# Patient Record
Sex: FEMALE | Race: WHITE | NOT HISPANIC OR LATINO
[De-identification: names, ages, dates, MRNs, and addresses within clinical notes are randomized per-mention and may not be internally consistent; named-entity substitution may affect disease eponyms.]

---

## 2018-06-21 ENCOUNTER — TRANSCRIPTION ENCOUNTER (OUTPATIENT)
Age: 83
End: 2018-06-21

## 2018-06-22 ENCOUNTER — OUTPATIENT (OUTPATIENT)
Dept: OUTPATIENT SERVICES | Facility: HOSPITAL | Age: 83
LOS: 1 days | End: 2018-06-22
Payer: MEDICARE

## 2018-06-22 VITALS
HEIGHT: 62 IN | RESPIRATION RATE: 17 BRPM | DIASTOLIC BLOOD PRESSURE: 62 MMHG | SYSTOLIC BLOOD PRESSURE: 178 MMHG | HEART RATE: 53 BPM | OXYGEN SATURATION: 95 % | WEIGHT: 147.71 LBS | TEMPERATURE: 98 F

## 2018-06-22 VITALS
RESPIRATION RATE: 16 BRPM | SYSTOLIC BLOOD PRESSURE: 167 MMHG | DIASTOLIC BLOOD PRESSURE: 70 MMHG | OXYGEN SATURATION: 97 % | HEART RATE: 62 BPM

## 2018-06-22 DIAGNOSIS — H25.21 AGE-RELATED CATARACT, MORGAGNIAN TYPE, RIGHT EYE: ICD-10-CM

## 2018-06-22 DIAGNOSIS — Z98.42 CATARACT EXTRACTION STATUS, LEFT EYE: Chronic | ICD-10-CM

## 2018-06-22 PROCEDURE — 66984 XCAPSL CTRC RMVL W/O ECP: CPT | Mod: RT

## 2018-06-22 PROCEDURE — V2632: CPT

## 2020-08-24 NOTE — ASU PATIENT PROFILE, ADULT - IS PATIENT PREGNANT?
Jasvir Hamilton [phone]  Spoke to pt, advised of clinical message as listed below, pt is in agreement with plan to F/U in 1 year. No further questions or concerns. Good verbal understanding.   
Please call pt- her CT chest was normal with only old healed granulomas/ calcifications. No acute findings. No further follow up needed at this time, consider repeat CT in 1 year for stability.   
no

## 2022-09-21 PROBLEM — I10 ESSENTIAL (PRIMARY) HYPERTENSION: Chronic | Status: ACTIVE | Noted: 2018-06-22

## 2022-09-26 DIAGNOSIS — Z72.0 TOBACCO USE: ICD-10-CM

## 2022-09-26 DIAGNOSIS — Z87.39 PERSONAL HISTORY OF OTHER DISEASES OF THE MUSCULOSKELETAL SYSTEM AND CONNECTIVE TISSUE: ICD-10-CM

## 2022-09-26 DIAGNOSIS — Z87.09 PERSONAL HISTORY OF OTHER DISEASES OF THE RESPIRATORY SYSTEM: ICD-10-CM

## 2022-09-26 DIAGNOSIS — Z82.49 FAMILY HISTORY OF ISCHEMIC HEART DISEASE AND OTHER DISEASES OF THE CIRCULATORY SYSTEM: ICD-10-CM

## 2022-09-26 DIAGNOSIS — D51.9 VITAMIN B12 DEFICIENCY ANEMIA, UNSPECIFIED: ICD-10-CM

## 2022-09-26 DIAGNOSIS — Z72.89 OTHER PROBLEMS RELATED TO LIFESTYLE: ICD-10-CM

## 2022-09-26 PROBLEM — Z00.00 ENCOUNTER FOR PREVENTIVE HEALTH EXAMINATION: Status: ACTIVE | Noted: 2022-09-26

## 2022-09-26 RX ORDER — METOPROLOL TARTRATE 25 MG/1
25 TABLET, FILM COATED ORAL
Refills: 0 | Status: ACTIVE | COMMUNITY

## 2022-09-28 ENCOUNTER — APPOINTMENT (OUTPATIENT)
Dept: PODIATRY | Facility: CLINIC | Age: 87
End: 2022-09-28

## 2022-09-28 VITALS — BODY MASS INDEX: 24.35 KG/M2 | HEIGHT: 60 IN | WEIGHT: 124 LBS

## 2022-09-28 DIAGNOSIS — B35.1 TINEA UNGUIUM: ICD-10-CM

## 2022-09-28 PROCEDURE — 11055 PARING/CUTG B9 HYPRKER LES 1: CPT | Mod: Q8

## 2022-09-28 PROCEDURE — 11720 DEBRIDE NAIL 1-5: CPT | Mod: Q8,59

## 2022-10-03 PROBLEM — B35.1 ONYCHOMYCOSIS: Status: ACTIVE | Noted: 2022-09-29

## 2022-10-03 NOTE — HISTORY OF PRESENT ILLNESS
[Sneakers] : chey [FreeTextEntry1] : Patient presents today for evaluation and care of high risk foot care.  Patient has peripheral vascular disease. She complains of pain. Patient is under the care of Dr. Greer who she last saw 2 weeks ago.

## 2022-10-03 NOTE — PHYSICAL EXAM
[Delayed in the Right Toes] : capillary refills delayed in the right toes [Delayed in the Left Toes] : capillary refills delayed in the left toes [0] : left foot posterior tibialis 0 [Sensation] : the sensory exam was normal to light touch and pinprick [No Focal Deficits] : no focal deficits [Deep Tendon Reflexes (DTR)] : deep tendon reflexes were 2+ and symmetric [Motor Exam] : the motor exam was normal [FreeTextEntry3] : DP pulses: trace. (Q8 Class Findings) [de-identified] : Equinus. Anterior displacement of the fat pad. Submetatarsalgia.  [FreeTextEntry1] : Left sub 4 IPK. Onychomycosis in multiple nails. The ones that are painful are 1 and 4 on the right and 1 on the left.  The 1st and 4th on the right are end-stage, yellow, thick, chalky, brittle with subungual debris and pain at the top and tips of the nails.

## 2022-10-03 NOTE — ASSESSMENT
[FreeTextEntry1] : \par Treatment: I trimmed and enucleated the keratotic lesion. I aggressively debulked mycotic nails and dystrophic nails  without incident so the patient can comfortably wear shoe gear. I applied Naftin gel and encouraged her to walk as much as possible. shoes are orthopedic and adequate. Follow-up in the office as needed.

## 2022-12-09 ENCOUNTER — OFFICE (OUTPATIENT)
Dept: URBAN - METROPOLITAN AREA CLINIC 90 | Facility: CLINIC | Age: 87
Setting detail: OPHTHALMOLOGY
End: 2022-12-09
Payer: MEDICARE

## 2022-12-09 DIAGNOSIS — H35.431: ICD-10-CM

## 2022-12-09 DIAGNOSIS — Z96.1: ICD-10-CM

## 2022-12-09 DIAGNOSIS — H04.523: ICD-10-CM

## 2022-12-09 DIAGNOSIS — H35.373: ICD-10-CM

## 2022-12-09 DIAGNOSIS — D31.32: ICD-10-CM

## 2022-12-09 DIAGNOSIS — H26.493: ICD-10-CM

## 2022-12-09 DIAGNOSIS — H16.223: ICD-10-CM

## 2022-12-09 PROCEDURE — 92250 FUNDUS PHOTOGRAPHY W/I&R: CPT | Performed by: OPHTHALMOLOGY

## 2022-12-09 PROCEDURE — 92134 CPTRZ OPH DX IMG PST SGM RTA: CPT | Performed by: OPHTHALMOLOGY

## 2022-12-09 PROCEDURE — 92014 COMPRE OPH EXAM EST PT 1/>: CPT | Performed by: OPHTHALMOLOGY

## 2022-12-09 ASSESSMENT — AXIALLENGTH_DERIVED
OS_AL: 23.8053
OD_AL: 23.8493
OD_AL: 23.7997
OS_AL: 23.9049
OD_AL: 24.4619
OS_AL: 23.9049

## 2022-12-09 ASSESSMENT — REFRACTION_CURRENTRX
OD_AXIS: 110
OD_AXIS: 108
OS_AXIS: 102
OS_VPRISM_DIRECTION: SV
OD_VPRISM_DIRECTION: SV
OD_OVR_VA: 20/
OS_SPHERE: -1.25
OD_SPHERE: -0.75
OD_CYLINDER: -0.75
OD_SPHERE: +1.75
OS_CYLINDER: -0.50
OS_SPHERE: +1.25
OS_AXIS: 100
OS_OVR_VA: 20/
OD_OVR_VA: 20/
OD_VPRISM_DIRECTION: SV
OS_VPRISM_DIRECTION: SV
OS_CYLINDER: -0.50
OD_CYLINDER: -0.75
OS_OVR_VA: 20/

## 2022-12-09 ASSESSMENT — REFRACTION_MANIFEST
OD_SPHERE: -2.00
OD_VA2: 20/20
OS_AXIS: 100
OD_AXIS: 95
OS_CYLINDER: -0.50
OS_VA2: 20/20
OS_SPHERE: -1.25
OD_ADD: +2.50
OS_VA1: 20/20-
OD_ADD: +2.50
OD_VA1: 20/25+
OS_AXIS: 098
OS_ADD: +2.50
OD_CYLINDER: -1.25
OS_ADD: +2.50
OS_CYLINDER: -0.50
OS_SPHERE: -1.25
OD_VA1: 20/40
OD_SPHERE: -0.75
OD_AXIS: 105
OD_CYLINDER: -0.75

## 2022-12-09 ASSESSMENT — CONFRONTATIONAL VISUAL FIELD TEST (CVF)
OD_FINDINGS: FULL
OS_FINDINGS: FULL

## 2022-12-09 ASSESSMENT — SPHEQUIV_DERIVED
OS_SPHEQUIV: -1.5
OD_SPHEQUIV: -1
OS_SPHEQUIV: -1.25
OD_SPHEQUIV: -2.625
OS_SPHEQUIV: -1.5
OD_SPHEQUIV: -1.125

## 2022-12-09 ASSESSMENT — KERATOMETRY
OS_K2POWER_DIOPTERS: 44.25
OS_K1POWER_DIOPTERS: 44.25
METHOD_AUTO_MANUAL: AUTO
OS_AXISANGLE_DEGREES: 090
OD_K2POWER_DIOPTERS: 44.25
OD_AXISANGLE_DEGREES: 121
OD_K1POWER_DIOPTERS: 43.75

## 2022-12-09 ASSESSMENT — LID POSITION - COMMENTS: OD_COMMENTS: HOODING

## 2022-12-09 ASSESSMENT — VISUAL ACUITY
OS_BCVA: 20/20-1
OD_BCVA: 20/20-2

## 2022-12-09 ASSESSMENT — LID EXAM ASSESSMENTS
OD_COMMENTS: HOODING
OD_BLEPHARITIS: RUL T
OS_COMMENTS: HOODING
OS_BLEPHARITIS: LUL T

## 2022-12-09 ASSESSMENT — REFRACTION_AUTOREFRACTION
OD_SPHERE: -0.75
OS_SPHERE: -0.75
OD_AXIS: 102
OD_CYLINDER: -0.50
OS_CYLINDER: -1.00
OS_AXIS: 097

## 2022-12-09 ASSESSMENT — TONOMETRY
OD_IOP_MMHG: 10
OS_IOP_MMHG: 12

## 2022-12-09 ASSESSMENT — SUPERFICIAL PUNCTATE KERATITIS (SPK)
OD_SPK: 2+
OS_SPK: 2+

## 2023-10-23 ENCOUNTER — OFFICE (OUTPATIENT)
Dept: URBAN - METROPOLITAN AREA CLINIC 90 | Facility: CLINIC | Age: 88
Setting detail: OPHTHALMOLOGY
End: 2023-10-23
Payer: MEDICARE

## 2023-10-23 DIAGNOSIS — H35.431: ICD-10-CM

## 2023-10-23 DIAGNOSIS — H26.493: ICD-10-CM

## 2023-10-23 DIAGNOSIS — H16.223: ICD-10-CM

## 2023-10-23 DIAGNOSIS — H04.523: ICD-10-CM

## 2023-10-23 DIAGNOSIS — D31.32: ICD-10-CM

## 2023-10-23 DIAGNOSIS — H35.373: ICD-10-CM

## 2023-10-23 PROCEDURE — 92014 COMPRE OPH EXAM EST PT 1/>: CPT | Performed by: OPHTHALMOLOGY

## 2023-10-23 PROCEDURE — 92250 FUNDUS PHOTOGRAPHY W/I&R: CPT | Performed by: OPHTHALMOLOGY

## 2023-10-23 ASSESSMENT — KERATOMETRY
OD_AXISANGLE_DEGREES: 006
OD_K2POWER_DIOPTERS: 44.00
METHOD_AUTO_MANUAL: AUTO
OS_K2POWER_DIOPTERS: 44.25
OS_K1POWER_DIOPTERS: 43.50
OS_AXISANGLE_DEGREES: 013
OD_K1POWER_DIOPTERS: 43.25

## 2023-10-23 ASSESSMENT — REFRACTION_MANIFEST
OD_SPHERE: -2.00
OD_CYLINDER: -1.25
OS_CYLINDER: -0.50
OS_SPHERE: -1.25
OS_AXIS: 100
OD_VA1: 20/25+
OS_ADD: +2.50
OS_CYLINDER: -0.50
OS_VA1: 20/20-
OS_AXIS: 098
OD_ADD: +2.50
OD_ADD: +2.50
OD_AXIS: 105
OS_ADD: +2.50
OD_CYLINDER: -0.75
OD_SPHERE: -0.75
OD_VA1: 20/40
OD_AXIS: 95
OS_SPHERE: -1.25
OS_VA2: 20/20
OD_VA2: 20/20

## 2023-10-23 ASSESSMENT — VISUAL ACUITY
OD_BCVA: 20/20-2
OS_BCVA: 20/20-1

## 2023-10-23 ASSESSMENT — AXIALLENGTH_DERIVED
OD_AL: 24.6109
OD_AL: 23.8408
OS_AL: 23.8963
OS_AL: 24.0472
OS_AL: 24.0472
OD_AL: 23.9909

## 2023-10-23 ASSESSMENT — REFRACTION_CURRENTRX
OS_OVR_VA: 20/
OS_VPRISM_DIRECTION: SV
OS_SPHERE: -1.25
OD_SPHERE: +1.75
OD_VPRISM_DIRECTION: SV
OD_CYLINDER: -0.75
OS_AXIS: 100
OD_CYLINDER: -0.75
OD_SPHERE: -0.75
OS_AXIS: 102
OD_AXIS: 110
OD_OVR_VA: 20/
OD_CYLINDER: -0.75
OD_OVR_VA: 20/
OD_VPRISM_DIRECTION: SV
OS_AXIS: 099
OS_OVR_VA: 20/
OS_CYLINDER: -0.50
OS_VPRISM_DIRECTION: SV
OD_AXIS: 108
OD_SPHERE: -0.75
OS_CYLINDER: -0.75
OD_AXIS: 09
OD_OVR_VA: 20/
OD_VPRISM_DIRECTION: SV
OS_SPHERE: +1.25
OS_CYLINDER: -0.50
OS_SPHERE: -0.75
OS_OVR_VA: 20/

## 2023-10-23 ASSESSMENT — TONOMETRY
OD_IOP_MMHG: 13
OS_IOP_MMHG: 10

## 2023-10-23 ASSESSMENT — SPHEQUIV_DERIVED
OD_SPHEQUIV: -2.625
OS_SPHEQUIV: -1.5
OD_SPHEQUIV: -0.75
OD_SPHEQUIV: -1.125
OS_SPHEQUIV: -1.125
OS_SPHEQUIV: -1.5

## 2023-10-23 ASSESSMENT — REFRACTION_AUTOREFRACTION
OS_SPHERE: -0.75
OD_AXIS: 100
OD_CYLINDER: -1.50
OS_CYLINDER: -0.75
OS_AXIS: 099
OD_SPHERE: 0.00

## 2023-10-23 ASSESSMENT — LID EXAM ASSESSMENTS
OS_BLEPHARITIS: LUL T
OD_BLEPHARITIS: RUL T
OD_COMMENTS: HOODING
OS_COMMENTS: HOODING

## 2023-10-23 ASSESSMENT — SUPERFICIAL PUNCTATE KERATITIS (SPK)
OD_SPK: 2+
OS_SPK: 2+

## 2023-10-23 ASSESSMENT — CONFRONTATIONAL VISUAL FIELD TEST (CVF)
OS_FINDINGS: FULL
OD_FINDINGS: FULL

## 2023-10-23 ASSESSMENT — LID POSITION - COMMENTS: OD_COMMENTS: HOODING

## 2024-04-10 ENCOUNTER — APPOINTMENT (OUTPATIENT)
Dept: PODIATRY | Facility: CLINIC | Age: 89
End: 2024-04-10
Payer: MEDICARE

## 2024-04-10 DIAGNOSIS — M79.674 PAIN IN RIGHT TOE(S): ICD-10-CM

## 2024-04-10 DIAGNOSIS — B35.1 TINEA UNGUIUM: ICD-10-CM

## 2024-04-10 DIAGNOSIS — I70.91 GENERALIZED ATHEROSCLEROSIS: ICD-10-CM

## 2024-04-10 DIAGNOSIS — M79.675 PAIN IN RIGHT TOE(S): ICD-10-CM

## 2024-04-10 DIAGNOSIS — L85.1 ACQUIRED KERATOSIS [KERATODERMA] PALMARIS ET PLANTARIS: ICD-10-CM

## 2024-04-10 PROCEDURE — 11055 PARING/CUTG B9 HYPRKER LES 1: CPT | Mod: Q8

## 2024-04-10 PROCEDURE — 11720 DEBRIDE NAIL 1-5: CPT | Mod: Q8,59

## 2024-04-12 PROBLEM — B35.1 ONYCHOMYCOSIS: Status: ACTIVE | Noted: 2022-09-26

## 2024-04-12 PROBLEM — M79.674 PAIN IN TOES OF BOTH FEET: Status: ACTIVE | Noted: 2022-09-29

## 2024-04-12 PROBLEM — I70.91 GENERALIZED ATHEROSCLEROSIS: Status: ACTIVE | Noted: 2022-09-29

## 2024-04-12 PROBLEM — L85.1 KERATODERMA, ACQUIRED: Status: ACTIVE | Noted: 2022-09-29

## 2024-04-15 NOTE — ASSESSMENT
[FreeTextEntry1] : Impression: Onychomycosis. Generalized atherosclerosis. IPK. Pain.  Treatment: Area was prepped with alcohol, and I trimmed and enucleated the keratotic lesion. I aggressively debulked mycotic nails and dystrophic nails without incident so the patient can comfortably wear shoe gear. I applied Naftin gel and encouraged her to walk as much as possible. Shoes are orthopedic and adequate. Follow-up in the office as needed.

## 2024-04-15 NOTE — HISTORY OF PRESENT ILLNESS
[Sneakers] : chey [FreeTextEntry1] : Patient presents today for evaluation and care of high-risk foot care.  Patient has peripheral vascular disease. She complains of pain.

## 2024-04-15 NOTE — PHYSICAL EXAM
[Delayed in the Right Toes] : capillary refills delayed in the right toes [Delayed in the Left Toes] : capillary refills delayed in the left toes [0] : left foot posterior tibialis 0 [Sensation] : the sensory exam was normal to light touch and pinprick [No Focal Deficits] : no focal deficits [Deep Tendon Reflexes (DTR)] : deep tendon reflexes were 2+ and symmetric [Motor Exam] : the motor exam was normal [FreeTextEntry3] : DP pulses: trace.  The patient has a class finding of Q8-Two Class B findings.  [de-identified] : Equinus. Anterior displacement of the fat pad. Submetatarsalgia.  [FreeTextEntry1] : Left sub 4 IPK. Onychomycosis in multiple nails. The ones that are painful are 1 and 4 on the right and 1 on the left.  The 1st and 4th on the right are end-stage, yellow, thick, chalky, brittle with subungual debris and pain at the top and tips of the nails. [Diminished Throughout Right Foot] : normal sensation with monofilament testing throughout right foot [Diminished Throughout Left Foot] : normal sensation with monofilament testing throughout left foot

## 2024-06-17 ENCOUNTER — OFFICE (OUTPATIENT)
Dept: URBAN - METROPOLITAN AREA CLINIC 90 | Facility: CLINIC | Age: 89
Setting detail: OPHTHALMOLOGY
End: 2024-06-17
Payer: MEDICARE

## 2024-06-17 DIAGNOSIS — H35.431: ICD-10-CM

## 2024-06-17 DIAGNOSIS — H26.493: ICD-10-CM

## 2024-06-17 DIAGNOSIS — H16.223: ICD-10-CM

## 2024-06-17 DIAGNOSIS — D31.32: ICD-10-CM

## 2024-06-17 DIAGNOSIS — H35.373: ICD-10-CM

## 2024-06-17 DIAGNOSIS — H43.393: ICD-10-CM

## 2024-06-17 PROCEDURE — 92134 CPTRZ OPH DX IMG PST SGM RTA: CPT | Performed by: OPHTHALMOLOGY

## 2024-06-17 PROCEDURE — 92014 COMPRE OPH EXAM EST PT 1/>: CPT | Performed by: OPHTHALMOLOGY

## 2024-06-17 ASSESSMENT — LID POSITION - COMMENTS: OD_COMMENTS: HOODING

## 2024-06-17 ASSESSMENT — LID EXAM ASSESSMENTS
OS_COMMENTS: HOODING
OD_COMMENTS: HOODING
OS_BLEPHARITIS: LUL T
OD_BLEPHARITIS: RUL T

## 2024-06-17 ASSESSMENT — CONFRONTATIONAL VISUAL FIELD TEST (CVF)
OS_FINDINGS: FULL
OD_FINDINGS: FULL

## 2024-11-07 ENCOUNTER — APPOINTMENT (OUTPATIENT)
Dept: PODIATRY | Facility: CLINIC | Age: 89
End: 2024-11-07

## 2024-12-19 ENCOUNTER — OFFICE (OUTPATIENT)
Facility: LOCATION | Age: 89
Setting detail: OPHTHALMOLOGY
End: 2024-12-19
Payer: MEDICARE

## 2024-12-19 DIAGNOSIS — H43.393: ICD-10-CM

## 2024-12-19 DIAGNOSIS — H35.373: ICD-10-CM

## 2024-12-19 DIAGNOSIS — H35.431: ICD-10-CM

## 2024-12-19 DIAGNOSIS — H01.001: ICD-10-CM

## 2024-12-19 DIAGNOSIS — H16.223: ICD-10-CM

## 2024-12-19 DIAGNOSIS — H26.493: ICD-10-CM

## 2024-12-19 DIAGNOSIS — H01.004: ICD-10-CM

## 2024-12-19 DIAGNOSIS — D31.32: ICD-10-CM

## 2024-12-19 PROCEDURE — 92250 FUNDUS PHOTOGRAPHY W/I&R: CPT | Performed by: OPHTHALMOLOGY

## 2024-12-19 PROCEDURE — 92014 COMPRE OPH EXAM EST PT 1/>: CPT | Performed by: OPHTHALMOLOGY

## 2024-12-19 ASSESSMENT — REFRACTION_CURRENTRX
OS_VPRISM_DIRECTION: SV
OS_SPHERE: +1.25
OD_SPHERE: -0.75
OD_VPRISM_DIRECTION: SV
OD_CYLINDER: -0.75
OD_SPHERE: -0.75
OD_AXIS: 105
OD_VPRISM_DIRECTION: SV
OS_AXIS: 100
OS_AXIS: 099
OD_AXIS: 09
OS_OVR_VA: 20/
OS_CYLINDER: -0.50
OS_CYLINDER: -0.50
OS_AXIS: 098
OS_AXIS: 093
OD_CYLINDER: -0.75
OS_SPHERE: -1.25
OS_SPHERE: -1.25
OS_SPHERE: -0.75
OS_CYLINDER: -0.50
OD_CYLINDER: -0.75
OS_AXIS: 102
OD_CYLINDER: -0.75
OD_AXIS: 100
OD_AXIS: 101
OD_CYLINDER: -0.75
OS_CYLINDER: -0.75
OD_VPRISM_DIRECTION: SV
OS_VPRISM_DIRECTION: SV
OD_CYLINDER: -0.75
OD_SPHERE: -0.75
OS_CYLINDER: -0.50
OS_VPRISM_DIRECTION: SV
OD_OVR_VA: 20/
OD_OVR_VA: 20/
OS_SPHERE: -1.25
OD_AXIS: 110
OS_OVR_VA: 20/
OD_SPHERE: +1.75
OD_SPHERE: -0.75
OS_CYLINDER: -0.50
OS_OVR_VA: 20/
OD_VPRISM_DIRECTION: SV
OD_AXIS: 108
OS_SPHERE: +1.25
OD_OVR_VA: 20/
OS_AXIS: 098
OD_SPHERE: +1.75

## 2024-12-19 ASSESSMENT — REFRACTION_MANIFEST
OS_SPHERE: -1.25
OS_VA1: 20/20-
OD_SPHERE: -2.00
OD_CYLINDER: -0.75
OS_ADD: +2.50
OD_VA1: 20/40
OD_AXIS: 105
OS_SPHERE: -1.25
OS_ADD: +2.50
OS_AXIS: 100
OD_VA2: 20/20
OS_AXIS: 098
OS_VA2: 20/20
OD_ADD: +2.50
OD_ADD: +2.50
OS_CYLINDER: -0.50
OD_VA1: 20/25+
OD_SPHERE: -0.75
OD_CYLINDER: -1.25
OS_CYLINDER: -0.50
OD_AXIS: 95

## 2024-12-19 ASSESSMENT — CONFRONTATIONAL VISUAL FIELD TEST (CVF)
OS_FINDINGS: FULL
OD_FINDINGS: FULL

## 2024-12-19 ASSESSMENT — KERATOMETRY
OS_K1POWER_DIOPTERS: 44.00
METHOD_AUTO_MANUAL: AUTO
OD_K2POWER_DIOPTERS: 44.00
OS_AXISANGLE_DEGREES: 023
OD_K1POWER_DIOPTERS: 43.75
OS_K2POWER_DIOPTERS: 44.75
OD_AXISANGLE_DEGREES: 174

## 2024-12-19 ASSESSMENT — LID POSITION - COMMENTS
OD_COMMENTS: HOODING
OS_COMMENTS: HOODING

## 2024-12-19 ASSESSMENT — SUPERFICIAL PUNCTATE KERATITIS (SPK)
OS_SPK: 2+
OD_SPK: 2+

## 2024-12-19 ASSESSMENT — LID EXAM ASSESSMENTS
OD_BLEPHARITIS: RUL T
OS_BLEPHARITIS: LUL T

## 2024-12-19 ASSESSMENT — TONOMETRY
OS_IOP_MMHG: 16
OS_IOP_MMHG: 13
OD_IOP_MMHG: 17
OD_IOP_MMHG: 13

## 2024-12-19 ASSESSMENT — VISUAL ACUITY
OS_BCVA: 20/20
OD_BCVA: 20/20-2

## 2024-12-19 ASSESSMENT — REFRACTION_AUTOREFRACTION
OD_AXIS: 105
OS_SPHERE: -1.00
OD_CYLINDER: -1.25
OS_AXIS: 088
OS_CYLINDER: -0.75
OD_SPHERE: -0.25

## 2024-12-19 ASSESSMENT — LID POSITION - PTOSIS
OD_PTOSIS: RUL 2+
OS_PTOSIS: LUL 1+

## 2025-08-11 ENCOUNTER — APPOINTMENT (OUTPATIENT)
Dept: PODIATRY | Facility: CLINIC | Age: 89
End: 2025-08-11